# Patient Record
Sex: FEMALE | Race: NATIVE HAWAIIAN OR OTHER PACIFIC ISLANDER | URBAN - METROPOLITAN AREA
[De-identification: names, ages, dates, MRNs, and addresses within clinical notes are randomized per-mention and may not be internally consistent; named-entity substitution may affect disease eponyms.]

---

## 2023-02-09 ENCOUNTER — LAB OUTSIDE AN ENCOUNTER (OUTPATIENT)
Dept: URBAN - METROPOLITAN AREA CLINIC 60 | Facility: CLINIC | Age: 78
End: 2023-02-09

## 2023-02-09 ENCOUNTER — WEB ENCOUNTER (OUTPATIENT)
Dept: URBAN - METROPOLITAN AREA CLINIC 60 | Facility: CLINIC | Age: 78
End: 2023-02-09

## 2023-02-09 ENCOUNTER — OFFICE VISIT (OUTPATIENT)
Dept: URBAN - METROPOLITAN AREA CLINIC 60 | Facility: CLINIC | Age: 78
End: 2023-02-09
Payer: COMMERCIAL

## 2023-02-09 VITALS
TEMPERATURE: 97.2 F | BODY MASS INDEX: 48.86 KG/M2 | OXYGEN SATURATION: 93 % | HEART RATE: 70 BPM | SYSTOLIC BLOOD PRESSURE: 138 MMHG | DIASTOLIC BLOOD PRESSURE: 88 MMHG | WEIGHT: 286.2 LBS | HEIGHT: 64 IN

## 2023-02-09 DIAGNOSIS — K62.89 RECTAL PAIN: ICD-10-CM

## 2023-02-09 DIAGNOSIS — K62.5 RECTAL BLEEDING: ICD-10-CM

## 2023-02-09 DIAGNOSIS — E66.01 MORBID OBESITY DUE TO EXCESS CALORIES: ICD-10-CM

## 2023-02-09 DIAGNOSIS — I25.84 CORONARY ATHEROSCLEROSIS DUE TO CALCIFIED CORONARY LESION: ICD-10-CM

## 2023-02-09 DIAGNOSIS — R19.4 CHANGE IN BOWEL HABITS: ICD-10-CM

## 2023-02-09 PROBLEM — 443502000 ATHEROSCLEROSIS OF CORONARY ARTERY: Status: ACTIVE | Noted: 2023-02-09

## 2023-02-09 PROBLEM — 238136002: Status: ACTIVE | Noted: 2023-02-09

## 2023-02-09 PROCEDURE — 99205 OFFICE O/P NEW HI 60 MIN: CPT | Performed by: NURSE PRACTITIONER

## 2023-02-09 RX ORDER — CLOPIDOGREL BISULFATE 75 MG/1
TABLET, FILM COATED ORAL
Qty: 90 TABLET | Status: ACTIVE | COMMUNITY

## 2023-02-09 RX ORDER — DULOXETINE HYDROCHLORIDE 30 MG/1
TAKE ONE CAPSULE BY MOUTH THREE TIMES DAILY CAPSULE, DELAYED RELEASE PELLETS ORAL
Qty: 90 EACH | Refills: 1 | Status: ACTIVE | COMMUNITY

## 2023-02-09 RX ORDER — ALLOPURINOL 100 MG/1
TABLET ORAL
Qty: 90 TABLET | Status: ACTIVE | COMMUNITY

## 2023-02-09 RX ORDER — LEVOTHYROXINE, LIOTHYRONINE 38; 9 UG/1; UG/1
TAKE 1 TABLET BY MOUTH ONCE DAILY ON AN EMPTY STOMACH TABLET ORAL
Qty: 90 EACH | Refills: 0 | Status: ACTIVE | COMMUNITY

## 2023-02-09 RX ORDER — ONDANSETRON HYDROCHLORIDE 4 MG/1
1 TABLET TABLET, FILM COATED ORAL
Qty: 2 | Refills: 0 | OUTPATIENT
Start: 2023-02-09

## 2023-02-09 RX ORDER — ROSUVASTATIN CALCIUM 40 MG/1
TAKE 1 TABLET BY MOUTH DAILY TABLET, FILM COATED ORAL
Qty: 90 EACH | Refills: 0 | Status: ACTIVE | COMMUNITY

## 2023-02-09 RX ORDER — LISINOPRIL 40 MG/1
TABLET ORAL
Qty: 90 TABLET | Status: ACTIVE | COMMUNITY

## 2023-02-09 RX ORDER — CARVEDILOL 25 MG/1
TABLET, FILM COATED ORAL
Qty: 180 TABLET | Status: ACTIVE | COMMUNITY

## 2023-02-09 RX ORDER — HYDRALAZINE HYDROCHLORIDE 25 MG/1
TAKE 1 TABLET BY MOUTH AS NEEDED UP TO 8 TABLETS PER DAY. FOR SYSTOLIC BLOOD PRESSURE ABOVE 16 TABLET ORAL
Qty: 180 EACH | Refills: 0 | Status: ACTIVE | COMMUNITY

## 2023-02-09 RX ORDER — FUROSEMIDE 20 MG/1
TABLET ORAL
Qty: 90 TABLET | Status: ACTIVE | COMMUNITY

## 2023-02-09 NOTE — HPI-HPI
Thank you very much for kindly referring Cordelia Colón, very pleasant 77-year-old female, to our service and ER follow-up and due to rectal pain and rectal bleeding.  Past medical history is significant for bilateral breast cancer (2017), CAD (clopidogrel, PCI with stents x4, CHF, HTN, HLD, CVA, MGUS, hypothyroidism, gout, nephrolithiasis, polycystic kidneys, CKD, sleep apnea (CPAP) and morbid obesity.  Past surgical history significant for herniorrhaphy, bladder suspension, tubal ligation, bilateral mastectomy and knee procedure.  She is EGD and colonoscopy naive and denies a family history of colon polyps, colon cancer or other GI malignancy.  Cordelia presents today after an ER visit in November 2022 due to one week of rectal bleeding, rectal pain and loose stool (see ED provider note below).  She states 6 months prior, her bowel habits were unremarkable averaging 1-2/day of easy to pass brown stool.  Prior to ER visit she had an episode of very hard to pass stool that resulted in blood and rectal pain and since that time she has had a change in bowel habits with fecal leakage, very soft stool and tenesmus, but no further episodes of blood per rectum.  She is otherwise asymptomatic from a GI perspective and denies pyrosis, dysphagia, dyspepsia, chest pain, abdominal pain, melena or unintentional weight loss.  **********  As per ED provider note dated 16 November 2022 (Abiodun Carreon MD): "76-year-old female presenting with rectal bleeding and pain worsening over the last month.  States that she notices blood when wiping.  States that she has had diarrhea.  Denies any nausea vomiting or abdominal pain.  States that she is tried Preparation H but she does not carry a history of hemorrhoids.   The history is provided by the patient. No  was used.  Rectal Pain The primary symptoms include diarrhea, hematochezia and rectal pain. Primary symptoms do not include abdominal pain, nausea, vomiting, melena, jaundiced or constipation. The diarrhea is a new problem. The onset was gradual. The diarrhea began more than 1 week ago. The hematochezia is a new problem. The onset was gradual. The hematochezia began more than 1 week ago. The illness does not include chills, back pain, fever, chest pain, shortness of breath, rash, dysuria, urgency, frequency or vaginal discharge. Associated medical issues do not include PUD. She has tried OTC medications. The overall improvement was no relief.

## 2023-02-09 NOTE — HPI-PREVIOUS IMAGING
CTA abdomen and pelvis/16 November 2022.  1.  No CT angiogram evidence of acute gastrointestinal hemorrhage.  2.  Colonic diverticulosis.  No evidence of acute diverticulitis.  3.  Polycystic kidneys with persistent complex cyst on the right measuring 4.5 cm.  If not already completed, MRI of the abdomen, renal mass protocol was recommended on a nonemergent basis. ********** MRI abdomen with and without contrast/06 June 2021 stable polycystic kidneys.  Few minimally complex cysts are present, Bosniak 2 equivalent.  No solid lesions.

## 2023-02-09 NOTE — HPI-PREVIOUS LABS
Lab work dated 20 January 2023 demonstrates the following abnormalities: Anion gap 6, BUN 19, creatinine 1.29, GFR 42.6, urine protein 100, leukocyte esterase small, urine WBC 6-10, urine bacteria rare.  All remaining lab values of BMP and urinalysis are negative or within normal limits.

## 2023-02-16 ENCOUNTER — LAB OUTSIDE AN ENCOUNTER (OUTPATIENT)
Dept: URBAN - METROPOLITAN AREA CLINIC 60 | Facility: CLINIC | Age: 78
End: 2023-02-16

## 2023-03-27 ENCOUNTER — TELEPHONE ENCOUNTER (OUTPATIENT)
Dept: URBAN - METROPOLITAN AREA CLINIC 60 | Facility: CLINIC | Age: 78
End: 2023-03-27

## 2023-04-04 ENCOUNTER — OFFICE VISIT (OUTPATIENT)
Dept: URBAN - METROPOLITAN AREA MEDICAL CENTER 14 | Facility: MEDICAL CENTER | Age: 78
End: 2023-04-04
Payer: COMMERCIAL

## 2023-04-04 DIAGNOSIS — K57.30 DIVERTCULOSIS OF LG INT W/O PERFORATION OR ABSCESS W/O BLEEDING: ICD-10-CM

## 2023-04-04 DIAGNOSIS — D12.4 POLYP OF DESCENDING COLON: ICD-10-CM

## 2023-04-04 DIAGNOSIS — K62.5 RECTAL BLEEDING: ICD-10-CM

## 2023-04-04 DIAGNOSIS — K64.0 GRADE I HEMORRHOIDS: ICD-10-CM

## 2023-04-04 PROCEDURE — 45380 COLONOSCOPY AND BIOPSY: CPT | Performed by: INTERNAL MEDICINE

## 2023-04-27 ENCOUNTER — OFFICE VISIT (OUTPATIENT)
Dept: URBAN - METROPOLITAN AREA CLINIC 60 | Facility: CLINIC | Age: 78
End: 2023-04-27
Payer: COMMERCIAL

## 2023-04-27 ENCOUNTER — DASHBOARD ENCOUNTERS (OUTPATIENT)
Age: 78
End: 2023-04-27

## 2023-04-27 VITALS
TEMPERATURE: 97.5 F | DIASTOLIC BLOOD PRESSURE: 86 MMHG | SYSTOLIC BLOOD PRESSURE: 138 MMHG | BODY MASS INDEX: 47.56 KG/M2 | HEART RATE: 68 BPM | HEIGHT: 64 IN | WEIGHT: 278.6 LBS | OXYGEN SATURATION: 95 %

## 2023-04-27 DIAGNOSIS — D12.5 ADENOMATOUS POLYP OF SIGMOID COLON: ICD-10-CM

## 2023-04-27 DIAGNOSIS — K57.90 DIVERTICULOSIS: ICD-10-CM

## 2023-04-27 DIAGNOSIS — K64.1 GRADE II HEMORRHOIDS: ICD-10-CM

## 2023-04-27 PROCEDURE — 99213 OFFICE O/P EST LOW 20 MIN: CPT | Performed by: NURSE PRACTITIONER

## 2023-04-27 RX ORDER — LISINOPRIL 40 MG/1
TABLET ORAL
Qty: 90 TABLET | Status: ACTIVE | COMMUNITY

## 2023-04-27 RX ORDER — DULOXETINE HYDROCHLORIDE 30 MG/1
TAKE ONE CAPSULE BY MOUTH THREE TIMES DAILY CAPSULE, DELAYED RELEASE PELLETS ORAL
Qty: 90 EACH | Refills: 1 | Status: ACTIVE | COMMUNITY

## 2023-04-27 RX ORDER — CLOPIDOGREL BISULFATE 75 MG/1
TABLET, FILM COATED ORAL
Qty: 90 TABLET | Status: ACTIVE | COMMUNITY

## 2023-04-27 RX ORDER — AMLODIPINE BESYLATE 5 MG/1
1 TABLET TABLET ORAL ONCE A DAY
Status: ACTIVE | COMMUNITY

## 2023-04-27 RX ORDER — ALLOPURINOL 100 MG/1
TABLET ORAL
Qty: 90 TABLET | Status: ACTIVE | COMMUNITY

## 2023-04-27 RX ORDER — HYDRALAZINE HYDROCHLORIDE 25 MG/1
TAKE 1 TABLET BY MOUTH AS NEEDED UP TO 8 TABLETS PER DAY. FOR SYSTOLIC BLOOD PRESSURE ABOVE 16 TABLET ORAL
Qty: 180 EACH | Refills: 0 | Status: ACTIVE | COMMUNITY

## 2023-04-27 RX ORDER — LEVOTHYROXINE, LIOTHYRONINE 38; 9 UG/1; UG/1
TAKE 1 TABLET BY MOUTH ONCE DAILY ON AN EMPTY STOMACH TABLET ORAL
Qty: 90 EACH | Refills: 0 | Status: ACTIVE | COMMUNITY

## 2023-04-27 RX ORDER — CARVEDILOL 25 MG/1
TABLET, FILM COATED ORAL
Qty: 180 TABLET | Status: ACTIVE | COMMUNITY

## 2023-04-27 RX ORDER — OMEPRAZOLE MAGNESIUM 2.5 MG/1
AS DIRECTED GRANULE, DELAYED RELEASE ORAL
Status: ACTIVE | COMMUNITY

## 2023-04-27 RX ORDER — FUROSEMIDE 20 MG/1
TABLET ORAL
Qty: 90 TABLET | Status: ACTIVE | COMMUNITY

## 2023-04-27 RX ORDER — ROSUVASTATIN CALCIUM 40 MG/1
TAKE 1 TABLET BY MOUTH DAILY TABLET, FILM COATED ORAL
Qty: 90 EACH | Refills: 0 | Status: ACTIVE | COMMUNITY

## 2023-04-27 NOTE — HPI-PREVIOUS PROCEDURES
Colonoscopy/04 April 2023. 1.  Pandiverticulosis. 2.  6 mm tubular adenomatous polyp removed from the sigmoid colon. 3.  Internal hemorrhoids. IMPRESSION AND RECOMMENDATION: The patient has pandiverticular disease and a solitary polyp.  Bleeding was likely hemorrhoidal.  After review of her age and comorbidity, would not pursue further screening colonoscopy.

## 2023-04-27 NOTE — HPI-HPI
Cordelia Colón is a very pleasant 77-year-old female seen in follow-up of colonoscopy (see results below).  She admits to tolerating the procedure very easily without any postprocedure complications.  She relates her bowel movements are back to normal, consisting of soft brown stool ([place] 4) that occasionally requires digital manipulation for complete bowel evacuation.  She denies any blood per rectum, rectal pain or melena.

## 2024-07-02 NOTE — PHYSICAL EXAM GASTROINTESTINAL
7 Days    Study type: [employer]    Hook up type: Office    Patient enrolled with a 7/ Day device    No ICD/Pacemaker    EKG performed 7/2/24    Monitor ID: [id number]    Wearing dates: 7/2 TO 7//7/24    Activation Time: 938AM    Ordering Provider: OSCAR         This is an office visit to apply the device to patient, to demonstrate and educate the patient regarding the monitor. Patient will wear monitor for 7 days. To record symptom in the diary provided.    The instructions were handed, explained and demonstrated in detail; Will return the monitor back to the same facility [facility] 3rd floor specialty area. Patient verbalized and understanding.    : Abdomen; soft, nontender, nondistended , no guarding or rigidity, no masses palpable , normal bowel sounds.  Liver and Spleen; no hepatomegaly present,  liver nontender , spleen not palpable